# Patient Record
Sex: MALE | Race: OTHER | NOT HISPANIC OR LATINO | URBAN - METROPOLITAN AREA
[De-identification: names, ages, dates, MRNs, and addresses within clinical notes are randomized per-mention and may not be internally consistent; named-entity substitution may affect disease eponyms.]

---

## 2023-06-04 ENCOUNTER — EMERGENCY (EMERGENCY)
Facility: HOSPITAL | Age: 33
LOS: 1 days | Discharge: HOME | End: 2023-06-04
Attending: EMERGENCY MEDICINE | Admitting: EMERGENCY MEDICINE
Payer: SELF-PAY

## 2023-06-04 VITALS
SYSTOLIC BLOOD PRESSURE: 112 MMHG | HEART RATE: 86 BPM | TEMPERATURE: 98 F | OXYGEN SATURATION: 97 % | RESPIRATION RATE: 18 BRPM | DIASTOLIC BLOOD PRESSURE: 73 MMHG

## 2023-06-04 PROCEDURE — 99283 EMERGENCY DEPT VISIT LOW MDM: CPT

## 2023-06-04 PROCEDURE — 99053 MED SERV 10PM-8AM 24 HR FAC: CPT

## 2023-06-04 NOTE — ED PROVIDER NOTE - NSFOLLOWUPINSTRUCTIONS_ED_ALL_ED_FT
POR FAVOR KARTHIK UN SEGUIMIENTO CON MIRELES MÉDICO DE ATENCIÓN PRIMARIA EN 1-2 DÍAS PARA HECTOR EVALUACIÓN ADICIONAL.    POR FAVOR LLEVE TODOS LOS DOCUMENTOS DE LA VISITA DE HOY A MIRELES MÉDICO PRINCIPAL.    SI NO TIENE UN MÉDICO DE ATENCIÓN PRIMARIA, CONSULTE LA INFORMACIÓN DEL CONSULTORIO/CLÍNICA QUE SE INDICA A CONTINUACIÓN.    Si no tiene un médico, puede llamar a nuestra línea de referencia para encontrar un médico que coincida con mireles seguro.    También puede hacer un seguimiento con las clínicas que se enumeran a continuación si no tiene un médico:  [facility]  [address] [place] [id number]  Para hacer hector urszula, danielaame al [phone]    [facility]  Dirección: [address] [place] [id number]  Centro de citas: [phone])    VUELVA A LA EMERGENCIA INMEDIATAMENTE O LLAME  CUALQUIER FIEBRE BOB, DOLOR DE PECHO, DIFICULTAD PARA RESPIRAR, VÓMITOS, DOLOR INTENSIVO O CUALQUIER OTRA PREOCUPACIÓN.

## 2023-06-04 NOTE — ED ADULT NURSE REASSESSMENT NOTE - NS ED NURSE REASSESS COMMENT FT1
Received handoff from Lydia Hawkins RN. patient currently awake and alert. Patient in no acute distress. Care ongoing.

## 2023-06-04 NOTE — ED PROVIDER NOTE - OBJECTIVE STATEMENT
33-year-old male with unknown past medical history brought in by EMS for alcohol intoxication.  EMS reports patient endorsed alcohol use and patient reports this as well.  Patient denies drug use, chest pain, abdominal pain, nausea/vomiting, headache.

## 2023-06-04 NOTE — ED PROVIDER NOTE - CLINICAL SUMMARY MEDICAL DECISION MAKING FREE TEXT BOX
33-year-old male with unknown past medical history brought in by EMS for alcohol intoxication.  EMS reports patient endorsed alcohol use and patient reports this as well.  Patient denies drug use, chest pain, abdominal pain, nausea/vomiting, headache.    On exam, patient is mildly intoxicated with clear speech and steady gait, alcohol on breath with response to both verbal and physical stimulation.  Pupils equal round and reactive to light.  Nonlabored respirations.  0.5cm inner upper lip laceration and swelling on right. No external signs of trauma to the extremities appreciated.    Patient brought in by EMS mildly intoxicated.  Will observe in the emergency department until clinically sober and safe for discharge.  No indication for imaging or other labs at this time. Laceration not requiring repair as it is not gaping or bleeding.

## 2023-06-04 NOTE — ED PROVIDER NOTE - PHYSICAL EXAMINATION
On exam, patient is mildly intoxicated with clear speech and steady gait, alcohol on breath with response to both verbal and physical stimulation.  Pupils equal round and reactive to light.  Nonlabored respirations.  0.5cm inner upper lip laceration and swelling on right. No external signs of trauma to the extremities appreciated.

## 2023-06-04 NOTE — ED PROVIDER NOTE - PATIENT PORTAL LINK FT
You can access the FollowMyHealth Patient Portal offered by [facility] by registering at the following website: [email or web address]. By joining [employer]’s FollowMyHealth portal, you will also be able to view your health information using other applications (apps) compatible with our system.

## 2023-06-04 NOTE — ED ADULT TRIAGE NOTE - CHIEF COMPLAINT QUOTE
Pt BIBA for ams. Pt admits to alcohol use, denies drug use, pain or injuries. No obvious injuries noted.

## 2023-06-04 NOTE — ED ADULT NURSE NOTE - NSFALLUNIVINTERV_ED_ALL_ED
Bed/Stretcher in lowest position, wheels locked, appropriate side rails in place/Call bell, personal items and telephone in reach/Instruct patient to call for assistance before getting out of bed/chair/stretcher/Non-slip footwear applied when patient is off stretcher/[place] to call system/Physically safe environment - no spills, clutter or unnecessary equipment/Purposeful proactive rounding/Room/bathroom lighting operational, light cord in reach

## 2023-06-07 DIAGNOSIS — R41.82 ALTERED MENTAL STATUS, UNSPECIFIED: ICD-10-CM

## 2023-06-07 DIAGNOSIS — F10.929 ALCOHOL USE, UNSPECIFIED WITH INTOXICATION, UNSPECIFIED: ICD-10-CM
